# Patient Record
Sex: FEMALE
[De-identification: names, ages, dates, MRNs, and addresses within clinical notes are randomized per-mention and may not be internally consistent; named-entity substitution may affect disease eponyms.]

---

## 2020-03-18 PROBLEM — Z00.00 ENCOUNTER FOR PREVENTIVE HEALTH EXAMINATION: Status: ACTIVE | Noted: 2020-03-18

## 2020-03-19 ENCOUNTER — APPOINTMENT (OUTPATIENT)
Dept: HOME HEALTH SERVICES | Facility: HOME HEALTH | Age: 85
End: 2020-03-19

## 2020-03-19 ENCOUNTER — APPOINTMENT (OUTPATIENT)
Dept: HOME HEALTH SERVICES | Facility: HOME HEALTH | Age: 85
End: 2020-03-19
Payer: MEDICARE

## 2020-03-19 VITALS
OXYGEN SATURATION: 98 % | HEART RATE: 64 BPM | TEMPERATURE: 97 F | DIASTOLIC BLOOD PRESSURE: 60 MMHG | WEIGHT: 100 LBS | RESPIRATION RATE: 16 BRPM | SYSTOLIC BLOOD PRESSURE: 128 MMHG | BODY MASS INDEX: 22.5 KG/M2 | HEIGHT: 56 IN

## 2020-03-19 DIAGNOSIS — F03.90 UNSPECIFIED DEMENTIA W/OUT BEHAVIORAL DISTURBANCE: ICD-10-CM

## 2020-03-19 DIAGNOSIS — K21.9 GASTRO-ESOPHAGEAL REFLUX DISEASE W/OUT ESOPHAGITIS: ICD-10-CM

## 2020-03-19 DIAGNOSIS — Z95.2 PRESENCE OF PROSTHETIC HEART VALVE: ICD-10-CM

## 2020-03-19 DIAGNOSIS — R13.10 DYSPHAGIA, UNSPECIFIED: ICD-10-CM

## 2020-03-19 DIAGNOSIS — I25.10 ATHEROSCLEROTIC HEART DISEASE OF NATIVE CORONARY ARTERY W/OUT ANGINA PECTORIS: ICD-10-CM

## 2020-03-19 DIAGNOSIS — M15.9 POLYOSTEOARTHRITIS, UNSPECIFIED: ICD-10-CM

## 2020-03-19 DIAGNOSIS — S22.000A WEDGE COMPRESSION FRACTURE OF UNSPECIFIED THORACIC VERTEBRA, INITIAL ENCOUNTER FOR CLOSED FRACTURE: ICD-10-CM

## 2020-03-19 DIAGNOSIS — Z99.81 DEPENDENCE ON SUPPLEMENTAL OXYGEN: ICD-10-CM

## 2020-03-19 DIAGNOSIS — Z87.81 PERSONAL HISTORY OF (HEALED) TRAUMATIC FRACTURE: ICD-10-CM

## 2020-03-19 DIAGNOSIS — I10 ESSENTIAL (PRIMARY) HYPERTENSION: ICD-10-CM

## 2020-03-19 DIAGNOSIS — K59.00 CONSTIPATION, UNSPECIFIED: ICD-10-CM

## 2020-03-19 DIAGNOSIS — K57.90 DIVERTICULOSIS OF INTESTINE, PART UNSPECIFIED, W/OUT PERFORATION OR ABSCESS W/OUT BLEEDING: ICD-10-CM

## 2020-03-19 DIAGNOSIS — I50.22 CHRONIC SYSTOLIC (CONGESTIVE) HEART FAILURE: ICD-10-CM

## 2020-03-19 DIAGNOSIS — F41.9 ANXIETY DISORDER, UNSPECIFIED: ICD-10-CM

## 2020-03-19 PROCEDURE — G0438: CPT

## 2020-03-19 PROCEDURE — 99497 ADVNCD CARE PLAN 30 MIN: CPT | Mod: 33

## 2020-03-19 PROCEDURE — G0506: CPT

## 2020-03-19 PROCEDURE — 99344 HOME/RES VST NEW MOD MDM 60: CPT | Mod: 25

## 2020-03-19 RX ORDER — AMLODIPINE AND VALSARTAN 10; 320 MG/1; MG/1
10-320 TABLET, FILM COATED ORAL DAILY
Qty: 90 | Refills: 3 | Status: ACTIVE | COMMUNITY
Start: 2020-03-19 | End: 1900-01-01

## 2020-03-19 RX ORDER — ALPRAZOLAM 0.5 MG/1
0.5 TABLET ORAL
Qty: 90 | Refills: 0 | Status: ACTIVE | COMMUNITY
Start: 2020-03-19 | End: 1900-01-01

## 2020-03-19 RX ORDER — CHOLECALCIFEROL (VITAMIN D3) 25 MCG
25 MCG TABLET ORAL DAILY
Refills: 0 | Status: ACTIVE | COMMUNITY
Start: 2020-03-19

## 2020-03-19 RX ORDER — MULTIVIT-MIN/FA/LYCOPEN/LUTEIN .4-300-25
TABLET ORAL DAILY
Qty: 90 | Refills: 3 | Status: DISCONTINUED | COMMUNITY
Start: 2020-03-19 | End: 2020-03-19

## 2020-03-19 RX ORDER — MAGNESIUM OXIDE 241.3 MG/1000MG
400 TABLET ORAL DAILY
Qty: 90 | Refills: 3 | Status: DISCONTINUED | COMMUNITY
Start: 2020-03-19 | End: 2020-03-19

## 2020-03-19 RX ORDER — LACTULOSE 10 G/15ML
20 SOLUTION ORAL DAILY
Refills: 3 | Status: DISCONTINUED | COMMUNITY
Start: 2020-03-19 | End: 2020-03-19

## 2020-03-19 RX ORDER — OMEGA-3/DHA/EPA/FISH OIL 300-1000MG
1000 CAPSULE ORAL DAILY
Refills: 0 | Status: DISCONTINUED | COMMUNITY
Start: 2020-03-19 | End: 2020-03-19

## 2020-03-19 RX ORDER — LUTEIN 6 MG
20 TABLET ORAL DAILY
Refills: 0 | Status: ACTIVE | COMMUNITY
Start: 2020-03-19

## 2020-03-19 RX ORDER — METOPROLOL SUCCINATE 100 MG/1
100 TABLET, EXTENDED RELEASE ORAL DAILY
Qty: 90 | Refills: 3 | Status: ACTIVE | COMMUNITY
Start: 2020-03-19 | End: 1900-01-01

## 2020-03-19 RX ORDER — MORPHINE SULFATE 100 MG/5ML
20 SOLUTION ORAL
Qty: 1 | Refills: 0 | Status: ACTIVE | COMMUNITY
Start: 2020-03-19

## 2020-03-19 NOTE — COUNSELING
[Normal Weight - ( BMI  <25 )] : normal weight - ( BMI  <25 ) [Continue diet as tolerated] : continue diet as tolerated based on goals of care [Use assistive device to avoid falls] : use assistive device to avoid falls [Remove clutter and unsafe carpeting to avoid falls] : remove clutter and unsafe carpeting to avoid falls [] : foot exam [Minimize unnecessary interventions] : minimize unnecessary interventions [Comfort Care] : comfort care [Discussed disease trajectory with patient/caregiver] : discussed disease trajectory with patient/caregiver [Advanced Directives discussed: ____] : Advanced directives discussed: [unfilled] [Annual Discussion and review of: ___] : Annual discussion and review of [unfilled] [Completed Healthcare Proxy] : completed healthcare proxy [Completed Medical Orders for Life-Sustaining Treatment] : completed medical orders for life-sustaining treatment [DNR] : Code Status: DNR [Comfort] : Treatment Guidelines: Comfort [DNI] : Intubation: DNI [Last Verification Date: _____] : [facility]: [unfilled] [_____] : HCP: [unfilled] [ - New patient with 2 or more chronic conditions; CCM discussed and patient-centered care plan established] : New patient with 2 or more chronic conditions; CCM discussed and patient-centered care plan established

## 2020-03-19 NOTE — CHRONIC CARE ASSESSMENT
[Other: ____] : [unfilled] [Can not Exercise (Disability)] : Exercise: The patient can not exercise due to disability [PPS Score: ____] : Palliative Performance Scale (PPS) Score: [unfilled] [FAST Score: ____] : Functional Assessment Scale (FAST) Score: [unfilled]

## 2020-03-23 ENCOUNTER — TRANSCRIPTION ENCOUNTER (OUTPATIENT)
Age: 85
End: 2020-03-23

## 2020-03-23 PROBLEM — F03.90 DEMENTIA: Status: ACTIVE | Noted: 2020-03-19

## 2020-03-23 PROBLEM — Z99.81 DEPENDENCE ON SUPPLEMENTAL OXYGEN: Status: ACTIVE | Noted: 2020-03-19

## 2020-03-23 PROBLEM — M15.9 GENERALIZED OSTEOARTHRITIS: Status: ACTIVE | Noted: 2020-03-19

## 2020-03-23 PROBLEM — I10 BENIGN ESSENTIAL HTN: Status: ACTIVE | Noted: 2020-03-19

## 2020-03-23 PROBLEM — K59.00 CONSTIPATION: Status: ACTIVE | Noted: 2020-03-19

## 2020-03-23 PROBLEM — I50.22 CHRONIC SYSTOLIC CONGESTIVE HEART FAILURE: Status: ACTIVE | Noted: 2020-03-19

## 2020-03-23 PROBLEM — R13.10 DYSPHAGIA: Status: ACTIVE | Noted: 2020-03-19

## 2020-03-23 PROBLEM — F41.9 ANXIETY: Status: ACTIVE | Noted: 2020-03-19

## 2020-03-23 PROBLEM — I25.10 CAD (CORONARY ARTERY DISEASE): Status: ACTIVE | Noted: 2020-03-19

## 2020-03-23 PROBLEM — K21.9 GERD WITHOUT ESOPHAGITIS: Status: ACTIVE | Noted: 2020-03-19

## 2020-03-23 PROBLEM — S22.000A THORACIC COMPRESSION FRACTURE: Status: ACTIVE | Noted: 2020-03-19

## 2020-03-23 PROBLEM — K57.90 DIVERTICULOSIS: Status: ACTIVE | Noted: 2020-03-19

## 2020-03-23 NOTE — HISTORY OF PRESENT ILLNESS
[Family Member] : family member [FreeTextEntry1] : Systolic Heart Failure, oxygen dependence [FreeTextEntry2] : PMH: Dementia, Systolic HF, CAD, HTN, Diverticulosis, Dysphagia, GERD, OA, Thoracic compression fractures\par \par HPI/ROS with daughter\par \par Pt alert to name and place, in bed for visit reports sore on buttocks -daughter has been using [employer]ih[employer] to area; denies any fever, chills, cough, SOB\par Pt was in [facility] in 9/19 for lethargy, HF, possible PE- daughter/pt refused work up and pt was discharged home on hospice and discharged from hospice on 3/10\par \par Dementia: FAST 4-5, ambulates to toilet; appetite good; uses xanax TID PRN\par \par CHF/CAD/HTN: as above- on Exforge, metoprolol; not on diuretics\par \par GERD/Dysphagia: prescribed pureed diet but refused same so daughter giving pt soft diet\par \par OA/thoracic compression fx: daughter uses Morphine if needed\par \par \par

## 2020-03-23 NOTE — HEALTH RISK ASSESSMENT
[HRA Reviewed] : Health risk assessment reviewed [Some assistance needed] : feeding [Independent] : using telephone [Full assistance needed] : managing finances [No falls in past year] : Patient reported no falls in the past year [de-identified] : cannot stand alone without assistance

## 2020-03-23 NOTE — PHYSICAL EXAM
[No Acute Distress] : no acute distress [Well Nourished] : well nourished [Well Developed] : well developed [Normal Voice/Communication] : normal voice communication [Normal Sclera/Conjunctiva] : normal sclera/conjunctiva [EOMI] : extra ocular movement intact [Normal Outer Ear/Nose] : the ears and nose were normal in appearance [No JVD] : no jugular venous distention [No Respiratory Distress] : no respiratory distress [Clear to Auscultation] : lungs were clear to auscultation bilaterally [No Accessory Muscle Use] : no accessory muscle use [Normal Rate] : heart rate was normal  [Regular Rhythm] : with a regular rhythm [Normal S1, S2] : normal S1 and S2 [No Murmurs] : no murmurs heard [No Edema] : there was no peripheral edema [Normal Bowel Sounds] : normal bowel sounds [Non Tender] : non-tender [Soft] : abdomen soft [Not Distended] : not distended [Normal Post Cervical Nodes] : no posterior cervical lymphadenopathy [Normal Anterior Cervical Nodes] : no anterior cervical lymphadenopathy [No CVA Tenderness] : no ~M costovertebral angle tenderness [No Spinal Tenderness] : no spinal tenderness [No Joint Swelling] : no joint swelling seen [No Clubbing, Cyanosis] : no clubbing  or cyanosis of the fingernails [No Rash] : no rash [Oriented x3] : oriented to person, place, and time [Normal Affect] : the affect was normal [de-identified] : 1cm scab (R) intergluteal fold

## 2020-03-30 ENCOUNTER — NON-APPOINTMENT (OUTPATIENT)
Age: 85
End: 2020-03-30